# Patient Record
Sex: FEMALE | Race: WHITE | Employment: UNEMPLOYED | URBAN - NONMETROPOLITAN AREA
[De-identification: names, ages, dates, MRNs, and addresses within clinical notes are randomized per-mention and may not be internally consistent; named-entity substitution may affect disease eponyms.]

---

## 2022-10-11 ENCOUNTER — INITIAL CONSULT (OUTPATIENT)
Dept: NEUROLOGY | Age: 42
End: 2022-10-11
Payer: COMMERCIAL

## 2022-10-11 VITALS
HEIGHT: 69 IN | HEART RATE: 114 BPM | DIASTOLIC BLOOD PRESSURE: 80 MMHG | BODY MASS INDEX: 39.25 KG/M2 | WEIGHT: 265 LBS | SYSTOLIC BLOOD PRESSURE: 118 MMHG | OXYGEN SATURATION: 98 %

## 2022-10-11 DIAGNOSIS — G43.109 MIGRAINE WITH AURA AND WITHOUT STATUS MIGRAINOSUS, NOT INTRACTABLE: Primary | ICD-10-CM

## 2022-10-11 PROCEDURE — G8417 CALC BMI ABV UP PARAM F/U: HCPCS | Performed by: PSYCHIATRY & NEUROLOGY

## 2022-10-11 PROCEDURE — G8427 DOCREV CUR MEDS BY ELIG CLIN: HCPCS | Performed by: PSYCHIATRY & NEUROLOGY

## 2022-10-11 PROCEDURE — 99205 OFFICE O/P NEW HI 60 MIN: CPT | Performed by: PSYCHIATRY & NEUROLOGY

## 2022-10-11 PROCEDURE — G8484 FLU IMMUNIZE NO ADMIN: HCPCS | Performed by: PSYCHIATRY & NEUROLOGY

## 2022-10-11 RX ORDER — MECLIZINE HCL 12.5 MG/1
12.5 TABLET ORAL 3 TIMES DAILY PRN
COMMUNITY
Start: 2022-06-30

## 2022-10-11 RX ORDER — ALBUTEROL SULFATE 90 UG/1
2 AEROSOL, METERED RESPIRATORY (INHALATION) EVERY 4 HOURS
COMMUNITY
Start: 2021-06-24

## 2022-10-11 RX ORDER — OMEPRAZOLE 20 MG/1
20 CAPSULE, DELAYED RELEASE ORAL DAILY
COMMUNITY
Start: 2022-04-19

## 2022-10-11 RX ORDER — SULFAMETHOXAZOLE AND TRIMETHOPRIM 800; 160 MG/1; MG/1
TABLET ORAL
COMMUNITY
Start: 2022-08-03

## 2022-10-11 RX ORDER — LIDOCAINE 4 G/G
1 PATCH TOPICAL DAILY
COMMUNITY
Start: 2022-08-14

## 2022-10-11 RX ORDER — MELOXICAM 15 MG/1
15 TABLET ORAL DAILY
COMMUNITY
Start: 2022-06-22

## 2022-10-11 RX ORDER — LEVALBUTEROL INHALATION SOLUTION 1.25 MG/3ML
1.25 SOLUTION RESPIRATORY (INHALATION) EVERY 4 HOURS PRN
COMMUNITY
Start: 2022-10-06

## 2022-10-11 RX ORDER — HYDROCODONE BITARTRATE AND HOMATROPINE METHYLBROMIDE ORAL SOLUTION 5; 1.5 MG/5ML; MG/5ML
LIQUID ORAL
COMMUNITY
Start: 2022-10-03

## 2022-10-11 RX ORDER — ACETAMINOPHEN 500 MG
500 TABLET ORAL EVERY 6 HOURS PRN
COMMUNITY
Start: 2022-06-22

## 2022-10-11 RX ORDER — IPRATROPIUM BROMIDE 21 UG/1
1 SPRAY, METERED NASAL 3 TIMES DAILY
COMMUNITY
Start: 2022-10-03

## 2022-10-11 RX ORDER — IBUPROFEN 800 MG/1
800 TABLET ORAL EVERY 6 HOURS PRN
COMMUNITY
Start: 2021-06-24

## 2022-10-11 RX ORDER — DICYCLOMINE HCL 20 MG
20 TABLET ORAL
COMMUNITY
Start: 2022-05-20

## 2022-10-11 RX ORDER — FAMOTIDINE 40 MG/1
40 TABLET, FILM COATED ORAL NIGHTLY
COMMUNITY
Start: 2022-06-30

## 2022-10-11 RX ORDER — ERENUMAB-AOOE 70 MG/ML
70 INJECTION SUBCUTANEOUS
Qty: 1 ADJUSTABLE DOSE PRE-FILLED PEN SYRINGE | Refills: 3 | Status: SHIPPED | OUTPATIENT
Start: 2022-10-11

## 2022-10-11 RX ORDER — FLUCONAZOLE 150 MG/1
TABLET ORAL
COMMUNITY
Start: 2022-10-06

## 2022-10-11 RX ORDER — PROMETHAZINE HYDROCHLORIDE 25 MG/1
25 TABLET ORAL EVERY 6 HOURS PRN
COMMUNITY
Start: 2022-05-17

## 2022-10-11 NOTE — PATIENT INSTRUCTIONS
Obtain previous MRI brain images from [facility]  MRI brain WO contrast  Start Aimovig 70 mg/ml subcutaneous injection monthly   Continue with Nurtec 75 mg daily as needed for breakthrough headache for now  Keep headache diary  Call with any new symptoms or concerns. Follow up in 2-3 months.

## 2022-10-27 NOTE — PROGRESS NOTES
Chief Complaint   Patient presents with    Consultation     NP headache         Juli Negro is a 40 y.o. female who presents today for evaluation of headache since childhood that have progressively gotten worse in the past 11 years. She reports she has been seen by neurology in the past and is establishing local care. Location of her pain is left frontal area. She can see bright floaters in her visual field with severe headache. She is nauseated, sensitive to light and sound with headache. She is not at risk of pregnancy as she has had tubal ligation and uterine ablation. She has been tried on over the counter medication, percocet, Imitrex, Topamax, Lamictal, Effexor, propranolol,  elavil without relief. She is currently on nurtec as needed which does help. Her sleep is poor and interrupted, she wakes up feeling tired from sleep. She has been told she snores. She does not take daytime naps. CT head done 9/29/22 showed No acute intracranial pathology currently identified. Family history is significant for headache in her mother, brother, and daughter. She denies chest pain. No shortness of breath, no neck pain. No vision changes. No dysphagia. No fever. No rash. No weight loss. History provided by patient accompanied by her mother. Past Medical History:   Diagnosis Date    Anxiety     Depression     Headache        There is no problem list on file for this patient. Allergies   Allergen Reactions    Ondansetron Hcl      Other reaction(s):  Other - comment required  \"makes migraines worse\"  Migraine      Topiramate      \"upsets my stomach\"       Current Outpatient Medications   Medication Sig Dispense Refill    acetaminophen (TYLENOL) 500 MG tablet Take 500 mg by mouth every 6 hours as needed      albuterol sulfate HFA (PROVENTIL;VENTOLIN;PROAIR) 108 (90 Base) MCG/ACT inhaler Inhale 2 puffs into the lungs every 4 hours      dicyclomine (BENTYL) 20 MG tablet Take 20 mg by mouth      famotidine (PEPCID) 40 MG tablet Take 40 mg by mouth nightly      fluconazole (DIFLUCAN) 150 MG tablet Every 3 days take a tablet for a month      HYDROcodone homatropine (HYCODAN) 5-1.5 MG/5ML solution       ibuprofen (ADVIL;MOTRIN) 800 MG tablet Take 800 mg by mouth every 6 hours as needed      ipratropium (ATROVENT) 0.03 % nasal spray 1 spray by Nasal route 3 times daily      levalbuterol (XOPENEX) 1.25 MG/3ML nebulizer solution Inhale 1.25 mg into the lungs every 4 hours as needed      lidocaine 4 % external patch Place 1 patch onto the skin daily      meclizine (ANTIVERT) 12.5 MG tablet Take 12.5 mg by mouth Three times daily as needed      meloxicam (MOBIC) 15 MG tablet Take 15 mg by mouth daily      omeprazole (PRILOSEC) 20 MG delayed release capsule Take 20 mg by mouth daily      promethazine (PHENERGAN) 25 MG tablet Take 25 mg by mouth every 6 hours as needed      propranolol (INDERAL XL) 80 MG extended release capsule Take 80 mg by mouth nightly      sulfamethoxazole-trimethoprim (BACTRIM DS;SEPTRA DS) 800-160 MG per tablet        No current facility-administered medications for this visit. Social History     Socioeconomic History    Marital status: Single     Spouse name: None    Number of children: None    Years of education: None    Highest education level: None   Tobacco Use    Smoking status: Every Day     Types: Cigarettes    Smokeless tobacco: Never   Vaping Use    Vaping Use: Never used   Substance and Sexual Activity    Alcohol use: Yes    Drug use: Never    Sexual activity: Yes     Partners: Male       Family History   Problem Relation Age of Onset    High Blood Pressure Mother     Depression Mother     Anxiety Disorder Mother     Arthritis Mother     Migraines Mother     Pain Mother     High Blood Pressure Father     Atrial Fibrillation Father     Diabetes Father          I reviewed the past medical history, allergies, medications, social history and family history.    Review of Systems   All systems reviewed, and are all negative, except what is mentioned in HPI      Vitals:    10/11/22 1100   BP: 118/80   Site: Left Upper Arm   Position: Sitting   Cuff Size: Large Adult   Pulse: (!) 114   SpO2: 98%   Weight: 265 lb (120.2 kg)   Height: 5' 9\" (1.753 m)       Physical Examination:  General appearance - alert, well appearing, and in no distress, oriented to person, place, and time and over weight  Mental status- Level of Alertness: awake  Orientation: person, place, time  Memory: normal  Fund of Knowledge: normal  Attention/Concentration: normal  Language: normal. Mood is normal.   Neck - supple, no significant adenopathy, carotids upstroke normal bilaterally. There is no axillary lymphadenopathy. There is no carotid bruit. No neck lymphadenopathy. No thyroid enlargement   Neurological -   Cranial [id number]:.   Cranial nerve II: Normal   Cranial nerve III: Pupils: equal, round, reactive to light  Cranial nerves III, IV, VI: Extraocular Movements: intact   Cranial nerve V: Facial sensation: intact   Cranial nerve VII:Facial strength: intact   Cranial nerve VIII: Hearing: intact   Cranial nerve IX: Palate Elevation intact bilaterally  Cranial nerve XI: Shoulder shrug intact bilaterally  Cranial nerve XII: Tongue midline   neck supple without rigidity, there is no limitation of range of motion of the neck. DTR's are  decreased distal and symmetric  Babinski sign negative   Motor exam is 5/5 in the upper and lower extremities. Normal muscle tone. There is no muscle atrophy. Sensory is intact for light touch   Coordination: finger to nose, intact  Gait and station intact   Abnormal movement none, vibration normal, proprioception normal  Skin - warm, dry to touch, normal coloration, no rashes, no suspicious skin lesions  Superficial temporal artery pulses are normal.   There is no limitation of range of motion of the neck.   There is no resting tremor, no pin rolling, no bradykinesia, no Hypohonia, normal blink rate.  Musculoskeletal: Has no hand arthritis, no limitation of ROM in any of the four extremities. There is no leg edema . The Heart was regular in rate and rhythm. No heart murmur  Chest Clear, with  good effort. Abdomen soft, intact bowel sounds. We reviewed the patient records from referring provider and available information in the EHR       ASSESSMENT:      Diagnosis Orders   1. Migraine with aura and without status migrainosus, not intractable           This is a 43-year-old female who presents with headaches since childhood that have progressively gotten worse in the past 11 years. She reports she has been seen by neurology in the past and is establishing local care. She has been tried on numerous medications for the headache including over-the-counter medication, Percocet, Imitrex, Topamax, Lamictal, Effexor, propranolol, and Elavil without relief. She is currently on Nurtec which does seem to help. I reviewed the CT Brain and agree with interpretation, I also reviewed the patient pertinent labs and records in the EHR and from other providers. CT head done 9/29/22 showed No acute intracranial pathology currently identified. She reports she underwent an MRI brain at [facility] few years ago, we will attempt to obtain these images for review. The patient was counseled about her symptoms and work up recommended, she was also counseled about medication options and side effects. We will repeat her MRI brain without contrast to screen for organic cause of his symptoms. We will start her on Aimovig 70 mg/mL subcutaneous injection monthly for headache prevention. After detailed discussion with the patient and her mother we agreed on the following plan.        Plan    Obtain previous MRI brain images from [place] [id number]  MRI brain WO contrast  Start Aimovig 70 mg/ml subcutaneous injection monthly   Continue with Nurtec 75 mg daily as needed for breakthrough headache for now  Keep headache diary  Call with any new symptoms or concerns. Follow up in 2-3 months.      Total time 65 min    Emma Suarez MD

## 2022-11-18 ENCOUNTER — HOSPITAL ENCOUNTER (OUTPATIENT)
Dept: MRI IMAGING | Age: 42
Discharge: HOME | End: 2022-11-18
Payer: COMMERCIAL

## 2022-11-18 DIAGNOSIS — G43.109 MIGRAINE WITH AURA AND WITHOUT STATUS MIGRAINOSUS, NOT INTRACTABLE: ICD-10-CM

## 2022-11-18 PROCEDURE — 70551 MRI BRAIN STEM W/O DYE: CPT

## 2022-11-21 ENCOUNTER — OFFICE VISIT (OUTPATIENT)
Dept: NEUROLOGY | Age: 42
End: 2022-11-21
Payer: COMMERCIAL

## 2022-11-21 VITALS
OXYGEN SATURATION: 98 % | WEIGHT: 270.4 LBS | BODY MASS INDEX: 40.05 KG/M2 | RESPIRATION RATE: 17 BRPM | SYSTOLIC BLOOD PRESSURE: 118 MMHG | HEIGHT: 69 IN | DIASTOLIC BLOOD PRESSURE: 78 MMHG | HEART RATE: 97 BPM

## 2022-11-21 DIAGNOSIS — G43.109 MIGRAINE WITH AURA AND WITHOUT STATUS MIGRAINOSUS, NOT INTRACTABLE: Primary | ICD-10-CM

## 2022-11-21 PROCEDURE — 99213 OFFICE O/P EST LOW 20 MIN: CPT | Performed by: NURSE PRACTITIONER

## 2022-11-21 PROCEDURE — G8427 DOCREV CUR MEDS BY ELIG CLIN: HCPCS | Performed by: NURSE PRACTITIONER

## 2022-11-21 PROCEDURE — 4004F PT TOBACCO SCREEN RCVD TLK: CPT | Performed by: NURSE PRACTITIONER

## 2022-11-21 PROCEDURE — G8417 CALC BMI ABV UP PARAM F/U: HCPCS | Performed by: NURSE PRACTITIONER

## 2022-11-21 PROCEDURE — G8484 FLU IMMUNIZE NO ADMIN: HCPCS | Performed by: NURSE PRACTITIONER

## 2022-11-21 RX ORDER — RIMEGEPANT SULFATE 75 MG/75MG
75 TABLET, ORALLY DISINTEGRATING ORAL EVERY OTHER DAY
Qty: 16 TABLET | Refills: 3 | Status: SHIPPED | OUTPATIENT
Start: 2022-11-21

## 2022-11-21 NOTE — PROGRESS NOTES
NEUROLOGY OUT PATIENT FOLLOW UP NOTE:  11/21/20223:01 PM    Modesta West is here for follow up for headache. ROS:  Respiratory : no cough, no shortness of breath  Cardiac: no chest pain. No palpitations. Renal : no flank pain, no hematuria    Skin: no rash      Allergies   Allergen Reactions    Ondansetron Hcl      Other reaction(s):  Other - comment required  \"makes migraines worse\"  Migraine      Topiramate      \"upsets my stomach\"       Current Outpatient Medications:     acetaminophen (TYLENOL) 500 MG tablet, Take 500 mg by mouth every 6 hours as needed, Disp: , Rfl:     albuterol sulfate HFA (PROVENTIL;VENTOLIN;PROAIR) 108 (90 Base) MCG/ACT inhaler, Inhale 2 puffs into the lungs every 4 hours, Disp: , Rfl:     dicyclomine (BENTYL) 20 MG tablet, Take 20 mg by mouth, Disp: , Rfl:     famotidine (PEPCID) 40 MG tablet, Take 40 mg by mouth nightly, Disp: , Rfl:     fluconazole (DIFLUCAN) 150 MG tablet, Every 3 days take a tablet for a month, Disp: , Rfl:     ibuprofen (ADVIL;MOTRIN) 800 MG tablet, Take 800 mg by mouth every 6 hours as needed, Disp: , Rfl:     ipratropium (ATROVENT) 0.03 % nasal spray, 1 spray by Nasal route 3 times daily, Disp: , Rfl:     levalbuterol (XOPENEX) 1.25 MG/3ML nebulizer solution, Inhale 1.25 mg into the lungs every 4 hours as needed, Disp: , Rfl:     lidocaine 4 % external patch, Place 1 patch onto the skin daily, Disp: , Rfl:     meclizine (ANTIVERT) 12.5 MG tablet, Take 12.5 mg by mouth Three times daily as needed, Disp: , Rfl:     meloxicam (MOBIC) 15 MG tablet, Take 15 mg by mouth daily, Disp: , Rfl:     omeprazole (PRILOSEC) 20 MG delayed release capsule, Take 20 mg by mouth daily, Disp: , Rfl:     promethazine (PHENERGAN) 25 MG tablet, Take 25 mg by mouth every 6 hours as needed, Disp: , Rfl:     propranolol (INDERAL XL) 80 MG extended release capsule, Take 80 mg by mouth nightly, Disp: , Rfl:     sulfamethoxazole-trimethoprim (BACTRIM DS;SEPTRA DS) 800-160 MG per tablet, , Disp: , Rfl:     HYDROcodone homatropine (HYCODAN) 5-1.5 MG/5ML solution, , Disp: , Rfl:     Erenumab-aooe (AIMOVIG) 70 MG/ML SOAJ, Inject 70 mg into the skin every 30 days (Patient not taking: Reported on 11/21/2022), Disp: 1 Adjustable Dose Pre-filled Pen Syringe, Rfl: 3    I reviewed the past medical history, allergies, medications, social history and family history. PE:   Vitals:    11/21/22 1453   BP: 118/78   Pulse: 97   Resp: 17   SpO2: 98%   Weight: 270 lb 6.4 oz (122.7 kg)   Height: 5' 9\" (1.753 m)     General Appearance:  awake, alert, oriented, in no acute distress  Gen: NAD, Language is Intact. Skin: no rash, lesion, dry  to touch. warm  Head: no rash, no icterus  Neck: There is no carotid bruits. The Neck is supple. There is no neck lymphadenopathy. Neuro: CN 2-12 grossly intact with no focal deficits. Power 5/5 Throughout symmetric, Reflexes are  symmetric. Long tracts are intact. Cerebellar exam is Intact. Sensory exam is intact to light touch. Gait is intact. Musculoskeletal:  Has no hand arthritis, no limitation of ROM in any of the four extremities. Lower extremities no edema          DATA:        Results for orders placed during the hospital encounter of 11/18/22    MRI BRAIN WO CONTRAST    Narrative  PROCEDURE: MRI BRAIN WO CONTRAST    INDICATION:  Migraine with aura and without status migrainosus, not intractable. Frequent migraine headaches. COMPARISON: No prior study. TECHNIQUE: Multiplanar and multiple spin echo MRI images were obtained of the brain without contrast.    FINDINGS:  The ventricles, cisterns and sulci are symmetric and normal in size and configuration. There are a few small scattered focal areas of T2/FLAIR prolongation in the subcortical deep white matter predominantly in the frontal lobes. No intra or extra-axial  mass is identified. No focal areas of restricted diffusion are present. The major vascular flow voids appear patent. Orbits are unremarkable. There is a small mucous retention cyst in the left maxillary sinus. There is mild mucosal thickening in the ethmoid air cells. Mastoid air cells are clear. Impression  1. No evidence of acute intracranial abnormality. 2. Mild scattered focal areas of T2/FLAIR hyperintensity in the supratentorial white matter. These are nonspecific but can be seen in the setting of migraine headaches. **This report has been created using voice recognition software. It may contain minor errors which are inherent in voice recognition technology. **    Final report electronically signed by Dr. Bridgette Segundo MD on 11/18/2022 4:21 PM           Assessment:     Diagnosis Orders   1. Migraine with aura and without status migrainosus, not intractable             Follow up for headache. She reports her headaches have been worse in the past 1 month as she is dealing with sinus issues. She is following with ENT regarding this. She is pending sinus surgery in 2 weeks. I shared with her that her MRI brain showed no concerning findings. She has not done the Aimovig injection as she was concerned about potential side effects. She is on Nurtec 75 mg daily as needed for breakthrough headache which has helped some. She has been tried on numerous medications for the headache including over-the-counter medication, Percocet, Imitrex, Topamax, Lamictal, Effexor, propranolol, and Elavil without relief. She is leary about trying the aimovig injections due to potential side effects. After detailed discussion with patient we agreed on the following plan. Plan:  Change Nurtec 75 mg to every other day for headache prevention. Keep headache diary  Call with any new symptoms or concerns. Follow up in 3 months.          Total time 26 min    BERNA Padilla - CNP

## 2022-11-21 NOTE — PATIENT INSTRUCTIONS
Change Nurtec 75 mg to every other day for headache prevention. Keep headache diary  Call with any new symptoms or concerns. Follow up in 3 months.

## 2023-02-24 ENCOUNTER — OFFICE VISIT (OUTPATIENT)
Dept: NEUROLOGY | Age: 43
End: 2023-02-24
Payer: COMMERCIAL

## 2023-02-24 VITALS
HEIGHT: 69 IN | BODY MASS INDEX: 39.93 KG/M2 | DIASTOLIC BLOOD PRESSURE: 72 MMHG | OXYGEN SATURATION: 100 % | HEART RATE: 92 BPM | SYSTOLIC BLOOD PRESSURE: 110 MMHG

## 2023-02-24 DIAGNOSIS — R51.9 FRONTAL HEADACHE: ICD-10-CM

## 2023-02-24 DIAGNOSIS — G43.109 MIGRAINE WITH AURA AND WITHOUT STATUS MIGRAINOSUS, NOT INTRACTABLE: Primary | ICD-10-CM

## 2023-02-24 PROCEDURE — G8417 CALC BMI ABV UP PARAM F/U: HCPCS | Performed by: PSYCHIATRY & NEUROLOGY

## 2023-02-24 PROCEDURE — G8427 DOCREV CUR MEDS BY ELIG CLIN: HCPCS | Performed by: PSYCHIATRY & NEUROLOGY

## 2023-02-24 PROCEDURE — G8484 FLU IMMUNIZE NO ADMIN: HCPCS | Performed by: PSYCHIATRY & NEUROLOGY

## 2023-02-24 PROCEDURE — 99214 OFFICE O/P EST MOD 30 MIN: CPT | Performed by: PSYCHIATRY & NEUROLOGY

## 2023-02-24 PROCEDURE — 4004F PT TOBACCO SCREEN RCVD TLK: CPT | Performed by: PSYCHIATRY & NEUROLOGY

## 2023-02-24 RX ORDER — BENZONATATE 100 MG/1
100 CAPSULE ORAL 3 TIMES DAILY PRN
COMMUNITY
Start: 2023-02-13

## 2023-02-24 RX ORDER — TRIAMCINOLONE ACETONIDE 55 UG/1
2 SPRAY, METERED NASAL DAILY
COMMUNITY
Start: 2023-02-06

## 2023-02-24 RX ORDER — AZELASTINE 1 MG/ML
2 SPRAY, METERED NASAL 2 TIMES DAILY
COMMUNITY
Start: 2023-02-06 | End: 2024-02-06

## 2023-02-24 RX ORDER — BUDESONIDE 0.5 MG/2ML
INHALANT ORAL
COMMUNITY
Start: 2023-01-19

## 2023-02-24 RX ORDER — ECHINACEA PURPUREA EXTRACT 125 MG
1 TABLET ORAL PRN
COMMUNITY
Start: 2023-02-13 | End: 2024-02-13

## 2023-02-24 RX ORDER — KETOROLAC TROMETHAMINE 10 MG/1
TABLET, FILM COATED ORAL
COMMUNITY
Start: 2023-01-03

## 2023-02-24 RX ORDER — LORATADINE 10 MG/1
10 TABLET ORAL DAILY
COMMUNITY
Start: 2023-02-06 | End: 2024-02-06

## 2023-02-24 RX ORDER — IPRATROPIUM BROMIDE 42 UG/1
2 SPRAY, METERED NASAL 4 TIMES DAILY
COMMUNITY
Start: 2022-04-10

## 2023-02-24 NOTE — PROGRESS NOTES
NEUROLOGY OUT PATIENT FOLLOW UP NOTE:  2/24/20233:20 PM    Jacqualine Primrose is here for follow up for headache. She is here to go over plan. Allergies   Allergen Reactions    Bee Venom      Other reaction(s): Other - comment required  hives    Food Hives     Patient states she is allergic to raisins  Patient states she is allergic to raisins      Ondansetron Hcl      Other reaction(s):  Other - comment required  \"makes migraines worse\"  Migraine      Topiramate      \"upsets my stomach\"       Current Outpatient Medications:     triamcinolone (NASACORT) 55 MCG/ACT nasal inhaler, 2 sprays by Nasal route daily, Disp: , Rfl:     sodium chloride (OCEAN) 0.65 % nasal spray, 1 spray by Nasal route as needed, Disp: , Rfl:     loratadine (CLARITIN) 10 MG tablet, Take 10 mg by mouth daily, Disp: , Rfl:     ketorolac (TORADOL) 10 MG tablet, , Disp: , Rfl:     ipratropium (ATROVENT) 0.06 % nasal spray, 2 sprays by Nasal route 4 times daily, Disp: , Rfl:     budesonide (PULMICORT) 0.5 MG/2ML nebulizer suspension, , Disp: , Rfl:     benzonatate (TESSALON) 100 MG capsule, Take 100 mg by mouth 3 times daily as needed, Disp: , Rfl:     azelastine (ASTELIN) 0.1 % nasal spray, 2 sprays by Nasal route 2 times daily, Disp: , Rfl:     Rimegepant Sulfate (NURTEC) 75 MG TBDP, Take 75 mg by mouth every other day, Disp: 16 tablet, Rfl: 3    acetaminophen (TYLENOL) 500 MG tablet, Take 500 mg by mouth every 6 hours as needed, Disp: , Rfl:     albuterol sulfate HFA (PROVENTIL;VENTOLIN;PROAIR) 108 (90 Base) MCG/ACT inhaler, Inhale 2 puffs into the lungs every 4 hours, Disp: , Rfl:     dicyclomine (BENTYL) 20 MG tablet, Take 20 mg by mouth, Disp: , Rfl:     famotidine (PEPCID) 40 MG tablet, Take 40 mg by mouth nightly, Disp: , Rfl:     fluconazole (DIFLUCAN) 150 MG tablet, Every 3 days take a tablet for a month, Disp: , Rfl:     ibuprofen (ADVIL;MOTRIN) 800 MG tablet, Take 800 mg by mouth every 6 hours as needed, Disp: , Rfl:     ipratropium (ATROVENT) 0.03 % nasal spray, 1 spray by Nasal route 3 times daily, Disp: , Rfl:     levalbuterol (XOPENEX) 1.25 MG/3ML nebulizer solution, Inhale 1.25 mg into the lungs every 4 hours as needed, Disp: , Rfl:     lidocaine 4 % external patch, Place 1 patch onto the skin daily, Disp: , Rfl:     meclizine (ANTIVERT) 12.5 MG tablet, Take 12.5 mg by mouth Three times daily as needed, Disp: , Rfl:     meloxicam (MOBIC) 15 MG tablet, Take 15 mg by mouth daily, Disp: , Rfl:     omeprazole (PRILOSEC) 20 MG delayed release capsule, Take 20 mg by mouth daily, Disp: , Rfl:     promethazine (PHENERGAN) 25 MG tablet, Take 25 mg by mouth every 6 hours as needed, Disp: , Rfl:     propranolol (INDERAL XL) 80 MG extended release capsule, Take 80 mg by mouth nightly, Disp: , Rfl:     HYDROcodone homatropine (HYCODAN) 5-1.5 MG/5ML solution, , Disp: , Rfl:     sulfamethoxazole-trimethoprim (BACTRIM DS;SEPTRA DS) 800-160 MG per tablet, , Disp: , Rfl:     Erenumab-aooe (AIMOVIG) 70 MG/ML SOAJ, Inject 70 mg into the skin every 30 days (Patient not taking: No sig reported), Disp: 1 Adjustable Dose Pre-filled Pen Syringe, Rfl: 3    I reviewed the past medical history, allergies, medications, social history and family history. PE:   Vitals:    02/24/23 1455   BP: 110/72   Site: Left Upper Arm   Position: Sitting   Cuff Size: Large Adult   Pulse: 92   SpO2: 100%   Height: 5' 9\" (1.753 m)     General Appearance:  awake, alert, oriented, in no acute distress  Gen: NAD, Language is Intact. Skin: no rash, lesion, dry  to touch. warm  Head: no rash, no icterus  Neck: There is no carotid bruits. The Neck is supple. Neuro: CN 2-12 grossly intact with no focal deficits. Power 5/5 Throughout symmetric, Reflexes are  symmetric. Long tracts are intact. Cerebellar exam is Intact. Sensory exam is intact to light touch. Gait is intact. Musculoskeletal:  Has no hand arthritis, no limitation of ROM in any of the four extremities.   Lower extremities no edema            MRI BRAIN WO CONTRAST (reviewed)    Narrative  PROCEDURE: MRI BRAIN WO CONTRAST    INDICATION:  Migraine with aura and without status migrainosus, not intractable. Frequent migraine headaches. COMPARISON: No prior study. TECHNIQUE: Multiplanar and multiple spin echo MRI images were obtained of the brain without contrast.    FINDINGS:  The ventricles, cisterns and sulci are symmetric and normal in size and configuration. There are a few small scattered focal areas of T2/FLAIR prolongation in the subcortical deep white matter predominantly in the frontal lobes. No intra or extra-axial  mass is identified. No focal areas of restricted diffusion are present. The major vascular flow voids appear patent. Orbits are unremarkable. There is a small mucous retention cyst in the left maxillary sinus. There is mild mucosal thickening in the ethmoid air cells. Mastoid air cells are clear. Impression  1. No evidence of acute intracranial abnormality. 2. Mild scattered focal areas of T2/FLAIR hyperintensity in the supratentorial white matter. These are nonspecific but can be seen in the setting of migraine headaches. **This report has been created using voice recognition software. It may contain minor errors which are inherent in voice recognition technology. **    Final report electronically signed by Dr. Logan Jeffries MD on 11/18/2022 4:21 PM           Assessment:     Diagnosis Orders   1. Migraine with aura and without status migrainosus, not intractable        2. Frontal headache             Follow up for headache. she states the headaches got better, and increased recently with the headache before and following COVID, with frontal area, she reports benefit to Nurtec that helps with severity only but not frequently. She denies side effects. She had sinus surgery with infection following that caused sinusitis. Her headaches are worse following the sinus surgery COVID infection.  The headaches changed, hence will obtain MRI brain. She has been tried on numerous medications for the headache including over-the-counter medication, Percocet, Imitrex, Topamax, Lamictal, Effexor, propranolol, and Elavil without relief. She is leary about trying the aimovig injections due to potential side effects. After detailed discussion with patient we agreed on the following plan. Plan:  MRI Brain without and with contrast, intractable headache, frontal.   Continue Nurtec 75 mg to every other day for headache prevention. Consider alternative medication for headache prevention. Keep headache diary  Call with any new symptoms or concerns. Follow up in 3 months.          Total time 32 min    Samira Alfonso MD

## 2023-02-24 NOTE — PATIENT INSTRUCTIONS
MRI Brain without and with contrast, intractable headache, frontal.   Continue Nurtec 75 mg to every other day for headache prevention. Keep headache diary  Call with any new symptoms or concerns. Follow up in 3 months.

## 2023-03-14 ENCOUNTER — HOSPITAL ENCOUNTER (OUTPATIENT)
Dept: MRI IMAGING | Age: 43
Discharge: HOME | End: 2023-03-14
Payer: COMMERCIAL

## 2023-03-14 DIAGNOSIS — R51.9 FRONTAL HEADACHE: ICD-10-CM

## 2023-03-14 DIAGNOSIS — G43.109 MIGRAINE WITH AURA AND WITHOUT STATUS MIGRAINOSUS, NOT INTRACTABLE: ICD-10-CM

## 2023-03-14 PROCEDURE — A9579 GAD-BASE MR CONTRAST NOS,1ML: HCPCS | Performed by: PSYCHIATRY & NEUROLOGY

## 2023-03-14 PROCEDURE — 6360000004 HC RX CONTRAST MEDICATION: Performed by: PSYCHIATRY & NEUROLOGY

## 2023-03-14 PROCEDURE — 70553 MRI BRAIN STEM W/O & W/DYE: CPT

## 2023-03-14 RX ADMIN — GADOTERIDOL 20 ML: 279.3 INJECTION, SOLUTION INTRAVENOUS at 16:37

## 2023-03-15 ENCOUNTER — TELEPHONE (OUTPATIENT)
Dept: NEUROLOGY | Age: 43
End: 2023-03-15

## 2023-03-15 NOTE — TELEPHONE ENCOUNTER
----- Message from Rohan Bear MD sent at 3/15/2023  9:58 AM EDT -----  Please let patient know MRI Brain is stable compared to prior study.   Rohan Bear MD 9:58 AM

## 2023-12-14 ENCOUNTER — OFFICE (OUTPATIENT)
Dept: URBAN - METROPOLITAN AREA CLINIC 13 | Facility: CLINIC | Age: 43
End: 2023-12-14
Payer: COMMERCIAL

## 2023-12-14 VITALS
HEART RATE: 101 BPM | WEIGHT: 283.8 LBS | DIASTOLIC BLOOD PRESSURE: 80 MMHG | HEIGHT: 69 IN | SYSTOLIC BLOOD PRESSURE: 128 MMHG

## 2023-12-14 DIAGNOSIS — E66.01 MORBID (SEVERE) OBESITY DUE TO EXCESS CALORIES: ICD-10-CM

## 2023-12-14 DIAGNOSIS — R13.10 DYSPHAGIA, UNSPECIFIED: ICD-10-CM

## 2023-12-14 DIAGNOSIS — K21.9 GASTRO-ESOPHAGEAL REFLUX DISEASE WITHOUT ESOPHAGITIS: ICD-10-CM

## 2023-12-14 DIAGNOSIS — Z90.49 ACQUIRED ABSENCE OF OTHER SPECIFIED PARTS OF DIGESTIVE TRACT: ICD-10-CM

## 2023-12-14 PROCEDURE — 99204 OFFICE O/P NEW MOD 45 MIN: CPT | Performed by: INTERNAL MEDICINE

## 2024-01-18 ENCOUNTER — OFFICE (OUTPATIENT)
Dept: URBAN - METROPOLITAN AREA PATHOLOGY 1 | Facility: PATHOLOGY | Age: 44
End: 2024-01-18
Payer: COMMERCIAL

## 2024-01-18 ENCOUNTER — AMBULATORY SURGICAL CENTER (OUTPATIENT)
Dept: URBAN - METROPOLITAN AREA SURGERY 4 | Facility: SURGERY | Age: 44
End: 2024-01-18
Payer: COMMERCIAL

## 2024-01-18 VITALS
SYSTOLIC BLOOD PRESSURE: 150 MMHG | OXYGEN SATURATION: 93 % | WEIGHT: 285 LBS | SYSTOLIC BLOOD PRESSURE: 144 MMHG | OXYGEN SATURATION: 95 % | DIASTOLIC BLOOD PRESSURE: 79 MMHG | OXYGEN SATURATION: 93 % | HEIGHT: 69 IN | SYSTOLIC BLOOD PRESSURE: 150 MMHG | OXYGEN SATURATION: 98 % | OXYGEN SATURATION: 94 % | OXYGEN SATURATION: 98 % | SYSTOLIC BLOOD PRESSURE: 135 MMHG | OXYGEN SATURATION: 100 % | TEMPERATURE: 97.5 F | RESPIRATION RATE: 17 BRPM | OXYGEN SATURATION: 91 % | RESPIRATION RATE: 17 BRPM | OXYGEN SATURATION: 100 % | HEART RATE: 82 BPM | HEART RATE: 102 BPM | RESPIRATION RATE: 5 BRPM | HEART RATE: 94 BPM | HEART RATE: 82 BPM | HEIGHT: 69 IN | DIASTOLIC BLOOD PRESSURE: 87 MMHG | SYSTOLIC BLOOD PRESSURE: 138 MMHG | OXYGEN SATURATION: 91 % | DIASTOLIC BLOOD PRESSURE: 92 MMHG | RESPIRATION RATE: 18 BRPM | HEART RATE: 104 BPM | RESPIRATION RATE: 16 BRPM | SYSTOLIC BLOOD PRESSURE: 138 MMHG | HEART RATE: 94 BPM | SYSTOLIC BLOOD PRESSURE: 186 MMHG | SYSTOLIC BLOOD PRESSURE: 135 MMHG | DIASTOLIC BLOOD PRESSURE: 103 MMHG | DIASTOLIC BLOOD PRESSURE: 83 MMHG | RESPIRATION RATE: 8 BRPM | OXYGEN SATURATION: 95 % | RESPIRATION RATE: 8 BRPM | HEART RATE: 102 BPM | HEART RATE: 101 BPM | HEART RATE: 101 BPM | WEIGHT: 285 LBS | TEMPERATURE: 97.5 F | SYSTOLIC BLOOD PRESSURE: 186 MMHG | DIASTOLIC BLOOD PRESSURE: 79 MMHG | RESPIRATION RATE: 16 BRPM | DIASTOLIC BLOOD PRESSURE: 103 MMHG | RESPIRATION RATE: 18 BRPM | DIASTOLIC BLOOD PRESSURE: 87 MMHG | OXYGEN SATURATION: 94 % | SYSTOLIC BLOOD PRESSURE: 144 MMHG | RESPIRATION RATE: 5 BRPM | HEART RATE: 104 BPM | DIASTOLIC BLOOD PRESSURE: 83 MMHG | DIASTOLIC BLOOD PRESSURE: 92 MMHG

## 2024-01-18 DIAGNOSIS — R13.10 DYSPHAGIA, UNSPECIFIED: ICD-10-CM

## 2024-01-18 DIAGNOSIS — K31.89 OTHER DISEASES OF STOMACH AND DUODENUM: ICD-10-CM

## 2024-01-18 DIAGNOSIS — K29.30 CHRONIC SUPERFICIAL GASTRITIS WITHOUT BLEEDING: ICD-10-CM

## 2024-01-18 DIAGNOSIS — K21.00 GASTRO-ESOPHAGEAL REFLUX DISEASE WITH ESOPHAGITIS, WITHOUT B: ICD-10-CM

## 2024-01-18 DIAGNOSIS — K29.70 GASTRITIS, UNSPECIFIED, WITHOUT BLEEDING: ICD-10-CM

## 2024-01-18 PROCEDURE — 43239 EGD BIOPSY SINGLE/MULTIPLE: CPT | Performed by: INTERNAL MEDICINE

## 2024-01-18 PROCEDURE — 88305 TISSUE EXAM BY PATHOLOGIST: CPT | Performed by: PATHOLOGY

## 2024-01-18 PROCEDURE — 88342 IMHCHEM/IMCYTCHM 1ST ANTB: CPT | Performed by: PATHOLOGY

## 2024-01-18 PROCEDURE — 43450 DILATE ESOPHAGUS 1/MULT PASS: CPT | Performed by: INTERNAL MEDICINE

## 2024-01-18 NOTE — SERVICEHPINOTES
Patient is undergoing EGD for evaluation of dysphagia. She has been consented for procedure and risks associated with EGD-dilation.

## 2024-01-25 LAB
PDF: PDF REPORT: (no result)
PDF: PDF REPORT: (no result)